# Patient Record
Sex: FEMALE | Race: WHITE | NOT HISPANIC OR LATINO | URBAN - METROPOLITAN AREA
[De-identification: names, ages, dates, MRNs, and addresses within clinical notes are randomized per-mention and may not be internally consistent; named-entity substitution may affect disease eponyms.]

---

## 2023-03-13 LAB
AMPHETAMINE (PRESENCE) IN URINE BY SCREEN METHOD: NORMAL
BARBITURATES PRESENCE IN URINE BY SCREEN METHOD: NORMAL
BENZODIAZEPINE (PRESENCE) IN URINE BY SCREEN METHOD: NORMAL
CANNABINOIDS IN URINE BY SCREEN METHOD: NORMAL
COCAINE (PRESENCE) IN URINE BY SCREEN METHOD: NORMAL
DRUG SCREEN COMMENT URINE: NORMAL
FENTANYL URINE: NORMAL
METHADONE (PRESENCE) IN URINE BY SCREEN METHOD: NORMAL
OPIATES (PRESENCE) IN URINE BY SCREEN METHOD: NORMAL
OXYCODONE (PRESENCE) IN URINE BY SCREEN METHOD: NORMAL
PHENCYCLIDINE (PRESENCE) IN URINE BY SCREEN METHOD: NORMAL

## 2023-11-20 PROBLEM — M65.4 DE QUERVAIN'S TENOSYNOVITIS, RIGHT: Status: ACTIVE | Noted: 2023-11-20

## 2023-11-20 PROBLEM — M25.561 KNEE PAIN, BILATERAL: Status: ACTIVE | Noted: 2023-11-20

## 2023-11-20 PROBLEM — S93.401A RIGHT ANKLE SPRAIN: Status: ACTIVE | Noted: 2023-11-20

## 2023-11-20 PROBLEM — F90.2 ADHD (ATTENTION DEFICIT HYPERACTIVITY DISORDER), COMBINED TYPE: Status: ACTIVE | Noted: 2023-11-20

## 2023-11-20 PROBLEM — G93.40 ENCEPHALOPATHY: Status: ACTIVE | Noted: 2023-11-20

## 2023-11-20 PROBLEM — M25.562 KNEE PAIN, BILATERAL: Status: ACTIVE | Noted: 2023-11-20

## 2023-11-20 RX ORDER — DEXTROAMPHETAMINE SACCHARATE, AMPHETAMINE ASPARTATE MONOHYDRATE, DEXTROAMPHETAMINE SULFATE AND AMPHETAMINE SULFATE 7.5; 7.5; 7.5; 7.5 MG/1; MG/1; MG/1; MG/1
CAPSULE, EXTENDED RELEASE ORAL
COMMUNITY
Start: 2021-02-25 | End: 2023-11-27

## 2023-11-27 ENCOUNTER — OFFICE VISIT (OUTPATIENT)
Dept: NEUROLOGY | Facility: CLINIC | Age: 33
End: 2023-11-27
Payer: COMMERCIAL

## 2023-11-27 VITALS
HEART RATE: 69 BPM | BODY MASS INDEX: 22.15 KG/M2 | HEIGHT: 63 IN | DIASTOLIC BLOOD PRESSURE: 84 MMHG | WEIGHT: 125 LBS | SYSTOLIC BLOOD PRESSURE: 114 MMHG

## 2023-11-27 DIAGNOSIS — G93.40 ENCEPHALOPATHY: Primary | ICD-10-CM

## 2023-11-27 DIAGNOSIS — F98.8 ADD (ATTENTION DEFICIT DISORDER) WITHOUT HYPERACTIVITY: ICD-10-CM

## 2023-11-27 PROCEDURE — 1036F TOBACCO NON-USER: CPT | Performed by: PSYCHIATRY & NEUROLOGY

## 2023-11-27 PROCEDURE — 99214 OFFICE O/P EST MOD 30 MIN: CPT | Performed by: PSYCHIATRY & NEUROLOGY

## 2023-11-27 RX ORDER — SULFAMETHOXAZOLE AND TRIMETHOPRIM 800; 160 MG/1; MG/1
1 TABLET ORAL 2 TIMES DAILY
COMMUNITY
Start: 2023-02-28 | End: 2023-03-07

## 2023-11-27 RX ORDER — AMPHETAMINE 20 MG/1
20 TABLET, EXTENDED RELEASE ORAL DAILY
Qty: 30 TABLET | Refills: 0 | Status: SHIPPED | OUTPATIENT
Start: 2024-01-25 | End: 2024-02-20 | Stop reason: SDUPTHER

## 2023-11-27 RX ORDER — CLOTRIMAZOLE 1 %
80 CREAM WITH APPLICATOR VAGINAL EVERY 6 HOURS PRN
COMMUNITY
Start: 2023-02-27

## 2023-11-27 RX ORDER — AMPHETAMINE 20 MG/1
TABLET, EXTENDED RELEASE ORAL
COMMUNITY
Start: 2023-11-09 | End: 2023-11-27 | Stop reason: SDUPTHER

## 2023-11-27 RX ORDER — FLUCONAZOLE 150 MG/1
TABLET ORAL
COMMUNITY
Start: 2023-01-04

## 2023-11-27 RX ORDER — AMPHETAMINE 20 MG/1
20 TABLET, EXTENDED RELEASE ORAL DAILY
Qty: 30 TABLET | Refills: 0 | Status: SHIPPED | OUTPATIENT
Start: 2023-12-26 | End: 2024-02-20 | Stop reason: SDUPTHER

## 2023-11-27 RX ORDER — AMPHETAMINE 20 MG/1
1 TABLET, EXTENDED RELEASE ORAL DAILY
Qty: 30 TABLET | Refills: 0 | Status: SHIPPED | OUTPATIENT
Start: 2023-11-27 | End: 2024-02-20 | Stop reason: SDUPTHER

## 2023-11-27 RX ORDER — CEPHALEXIN 500 MG/1
500 CAPSULE ORAL 2 TIMES DAILY
COMMUNITY
Start: 2022-11-30 | End: 2022-12-07

## 2023-11-27 RX ORDER — DEXMETHYLPHENIDATE HYDROCHLORIDE 30 MG/1
30 CAPSULE, EXTENDED RELEASE ORAL DAILY
COMMUNITY
Start: 2023-03-08 | End: 2023-11-27 | Stop reason: WASHOUT

## 2023-11-27 ASSESSMENT — ENCOUNTER SYMPTOMS
FREQUENCY: 0
PHOTOPHOBIA: 0
VOMITING: 0
DIFFICULTY URINATING: 0
HEADACHES: 0
LIGHT-HEADEDNESS: 0
NAUSEA: 0
NUMBNESS: 0
COUGH: 0
NECK PAIN: 0
FATIGUE: 0
SEIZURES: 0
CONFUSION: 0
WEAKNESS: 0
NECK STIFFNESS: 0
FEVER: 0
UNEXPECTED WEIGHT CHANGE: 0
BACK PAIN: 0
FACIAL ASYMMETRY: 0
WHEEZING: 0
BRUISES/BLEEDS EASILY: 0
HALLUCINATIONS: 0
ADENOPATHY: 0
AGITATION: 0
TREMORS: 0
ARTHRALGIAS: 0
TROUBLE SWALLOWING: 0
SPEECH DIFFICULTY: 0
SHORTNESS OF BREATH: 0
HYPERACTIVE: 0
PALPITATIONS: 0
SINUS PRESSURE: 0
ABDOMINAL PAIN: 0
SLEEP DISTURBANCE: 0
DIZZINESS: 0
EYE PAIN: 0
JOINT SWELLING: 0

## 2023-11-27 ASSESSMENT — PATIENT HEALTH QUESTIONNAIRE - PHQ9
1. LITTLE INTEREST OR PLEASURE IN DOING THINGS: NOT AT ALL
SUM OF ALL RESPONSES TO PHQ9 QUESTIONS 1 & 2: 0
2. FEELING DOWN, DEPRESSED OR HOPELESS: NOT AT ALL

## 2023-11-27 NOTE — PROGRESS NOTES
Melina Hurst  33 y.o.       SUBJECTIVE    ADHD  Pertinent negatives include no abdominal pain, arthralgias, chest pain, coughing, fatigue, fever, headaches, joint swelling, nausea, neck pain, numbness, rash, vomiting or weakness.      Melina is a 33-year-old young lady who was seen today for follow-up of her encephalopathy due to attention deficit disorder with difficulty at work and home.  Since last seen she has done very well on Dyanavel XR 20 mg daily and can tell a big difference when she is on medicine compared to of medicine.  No side effects of the medication today her physical and neurological examination was normal.  I would like to continue all her medications the way she is taking and have discussed the controlled substance policy, abusive potential, risk benefit and the precautions to be taken and depending on how she does I might make future recommendation when she comes back to see me in 3 months.    I did review the medication list.  Due to technical limitations of voice recognition and human error, this note may not accurately reflect the care of the patient.    Review of Systems   Constitutional:  Negative for fatigue, fever and unexpected weight change.   HENT:  Negative for dental problem, ear pain, hearing loss, sinus pressure, tinnitus and trouble swallowing.    Eyes:  Negative for photophobia, pain and visual disturbance.   Respiratory:  Negative for cough, shortness of breath and wheezing.    Cardiovascular:  Negative for chest pain, palpitations and leg swelling.   Gastrointestinal:  Negative for abdominal pain, nausea and vomiting.   Genitourinary:  Negative for difficulty urinating, enuresis and frequency.   Musculoskeletal:  Negative for arthralgias, back pain, joint swelling, neck pain and neck stiffness.   Skin:  Negative for pallor and rash.   Allergic/Immunologic: Negative for food allergies.   Neurological:  Negative for dizziness, tremors, seizures, syncope, facial  "asymmetry, speech difficulty, weakness, light-headedness, numbness and headaches.   Hematological:  Negative for adenopathy. Does not bruise/bleed easily.   Psychiatric/Behavioral:  Negative for agitation, behavioral problems, confusion, hallucinations and sleep disturbance. The patient is not hyperactive.         Patient Active Problem List   Diagnosis    ADHD (attention deficit hyperactivity disorder), combined type    De Quervain's tenosynovitis, right    Encephalopathy    Knee pain, bilateral    Right ankle sprain     Past Medical History:   Diagnosis Date    Personal history of other diseases of the female genital tract     History of dysmenorrhea    Personal history of other mental and behavioral disorders     History of attention deficit disorder     History reviewed. No pertinent surgical history.    reports that she has never smoked. She has never used smokeless tobacco.    /84 (BP Location: Left arm, Patient Position: Sitting)   Pulse 69   Ht 1.6 m (5' 3\")   Wt 56.7 kg (125 lb)   BMI 22.14 kg/m²     OBJECTIVE  Physical Exam/Neurological Exam   Constitutional: General appearance: no acute distress   Auscultation of Heart: Regular rate and rhythm, no murmurs, normal S1 and S2.   Carotid Arteries: Intact without any bruits.   Neck is supple.   No lymph adenopathy.   Peripheral Vascular Exam: Pulses +2 and equal in all extremities. No swelling, varicosities, edema or tenderness to palpations.    Abdomen is soft, nondistended. No organomegaly.  Mental status: The patient was in no distress, alert, interactive and cooperative. Affect is appropriate.   Orientation: oriented to person, oriented to place and oriented to time.   Memory: recent memory intact and remote memory intact.   Attention: normal attention span and normal concentrating ability.   Language: normal comprehension and no difficulty naming common objects.   Fund of knowledge: Patient displays adequate knowledge of current events, adequate " fund of knowledge regarding past history and adequate fund of knowledge regarding vocabulary.   Eyes: The ophthalmoscopic examination was normal. The fundi are visualized with normal disc margins and without.  Cranial nerve II: Visual fields full to confrontation.   Cranial nerves III, IV, and VI: Pupils round, equally reactive to light; no ptosis. EOMs intact. No nystagmus.   Cranial Nerve V: Facial sensation intact bilaterally.   Cranial nerve VII: Normal and symmetric facial strength.   Cranial nerve VIII: Hearing is intact bilaterally to finger rub / whisper.   Cranial nerves IX and X: Palate elevates symmetrically.   Cranial nerve XI: Shoulder shrug and neck rotation strength are intact.   Cranial nerve XII: Tongue midline with normal strength.   Motor: Motor exam was normal. Muscle bulk was normal in both upper and lower extremities. Muscle tone was normal in both upper and lower extremities. Muscle strength was 5/5 throughout. no abnormal or adventitious movements were present.   Deep Tendon Reflexes: left biceps 2+ , right biceps 2+, left triceps 2+, right triceps 2+, left brachioradialis 2+, right brachioradialis 2+, left patella 2+, right patella 2+, left ankle jerk 2+, right ankle jerk 2+   Plantar Reflex: Toes downgoing to plantar stimulation on the left. Toes downgoing to plantar stimulation on the right.   Sensory Exam: Normal to light touch.   Coordination: There is no limb dystaxia and rapid alternating movements are intact.  Gait: Gait is normal without spasticity, ataxia or bradykinesia. Stance is stable with a negative Romberg.      ASSESSMENT/PLAN  Diagnoses and all orders for this visit:  Encephalopathy  ADD (attention deficit disorder) without hyperactivity  -     Dyanavel XR 20 mg tablet, IR - ER, biphasic 24hr; Take 1 tablet by mouth once daily.  -     amphetamine (Dyanavel XR) 20 mg tablet, IR - ER, biphasic 24hr; Take 20 mg by mouth once daily. Do not start before December 26, 2023.  -      amphetamine (Dyanavel XR) 20 mg tablet, IR - ER, biphasic 24hr; Take 20 mg by mouth once daily. Do not start before January 25, 2024.        Ashu White MD  11/27/2023  3:36 PM

## 2024-02-20 ENCOUNTER — OFFICE VISIT (OUTPATIENT)
Dept: NEUROLOGY | Facility: CLINIC | Age: 34
End: 2024-02-20
Payer: COMMERCIAL

## 2024-02-20 VITALS
HEART RATE: 87 BPM | SYSTOLIC BLOOD PRESSURE: 110 MMHG | WEIGHT: 128.2 LBS | HEIGHT: 63 IN | BODY MASS INDEX: 22.71 KG/M2 | DIASTOLIC BLOOD PRESSURE: 78 MMHG

## 2024-02-20 DIAGNOSIS — G93.40 ENCEPHALOPATHY: Primary | ICD-10-CM

## 2024-02-20 DIAGNOSIS — F98.8 ADD (ATTENTION DEFICIT DISORDER) WITHOUT HYPERACTIVITY: ICD-10-CM

## 2024-02-20 DIAGNOSIS — F90.2 ADHD (ATTENTION DEFICIT HYPERACTIVITY DISORDER), COMBINED TYPE: ICD-10-CM

## 2024-02-20 LAB
AMPHETAMINES UR QL SCN: ABNORMAL
BARBITURATES UR QL SCN: ABNORMAL
BENZODIAZ UR QL SCN: ABNORMAL
BZE UR QL SCN: ABNORMAL
CANNABINOIDS UR QL SCN: ABNORMAL
FENTANYL+NORFENTANYL UR QL SCN: ABNORMAL
OPIATES UR QL SCN: ABNORMAL
OXYCODONE+OXYMORPHONE UR QL SCN: ABNORMAL
PCP UR QL SCN: ABNORMAL

## 2024-02-20 PROCEDURE — 80307 DRUG TEST PRSMV CHEM ANLYZR: CPT

## 2024-02-20 PROCEDURE — 99214 OFFICE O/P EST MOD 30 MIN: CPT | Performed by: PSYCHIATRY & NEUROLOGY

## 2024-02-20 PROCEDURE — 80324 DRUG SCREEN AMPHETAMINES 1/2: CPT

## 2024-02-20 PROCEDURE — 1036F TOBACCO NON-USER: CPT | Performed by: PSYCHIATRY & NEUROLOGY

## 2024-02-20 RX ORDER — AMPHETAMINE 20 MG/1
20 TABLET, EXTENDED RELEASE ORAL DAILY
Qty: 30 TABLET | Refills: 0 | Status: SHIPPED | OUTPATIENT
Start: 2024-03-20 | End: 2024-05-16 | Stop reason: SDUPTHER

## 2024-02-20 RX ORDER — AMPHETAMINE 20 MG/1
1 TABLET, EXTENDED RELEASE ORAL DAILY
Qty: 30 TABLET | Refills: 0 | Status: SHIPPED | OUTPATIENT
Start: 2024-02-20 | End: 2024-05-16 | Stop reason: SDUPTHER

## 2024-02-20 RX ORDER — AMPHETAMINE 20 MG/1
20 TABLET, EXTENDED RELEASE ORAL DAILY
Qty: 30 TABLET | Refills: 0 | Status: SHIPPED | OUTPATIENT
Start: 2024-04-19 | End: 2024-05-16 | Stop reason: SDUPTHER

## 2024-02-20 ASSESSMENT — ENCOUNTER SYMPTOMS
LIGHT-HEADEDNESS: 0
BRUISES/BLEEDS EASILY: 0
ARTHRALGIAS: 0
CONFUSION: 0
HYPERACTIVE: 0
SPEECH DIFFICULTY: 0
NAUSEA: 0
TREMORS: 0
NECK STIFFNESS: 0
TROUBLE SWALLOWING: 0
SINUS PRESSURE: 0
ABDOMINAL PAIN: 0
JOINT SWELLING: 0
SEIZURES: 0
FREQUENCY: 0
DIZZINESS: 0
FATIGUE: 0
VOMITING: 0
PALPITATIONS: 0
HEADACHES: 0
ADENOPATHY: 0
FACIAL ASYMMETRY: 0
PHOTOPHOBIA: 0
NECK PAIN: 0
WHEEZING: 0
SLEEP DISTURBANCE: 0
BACK PAIN: 0
COUGH: 0
UNEXPECTED WEIGHT CHANGE: 0
EYE PAIN: 0
NUMBNESS: 0
AGITATION: 0
FEVER: 0
DIFFICULTY URINATING: 0
WEAKNESS: 0
SHORTNESS OF BREATH: 0
HALLUCINATIONS: 0

## 2024-02-20 ASSESSMENT — PATIENT HEALTH QUESTIONNAIRE - PHQ9
SUM OF ALL RESPONSES TO PHQ9 QUESTIONS 1 & 2: 0
2. FEELING DOWN, DEPRESSED OR HOPELESS: NOT AT ALL
1. LITTLE INTEREST OR PLEASURE IN DOING THINGS: NOT AT ALL

## 2024-02-20 NOTE — PROGRESS NOTES
Melina Hurst  33 y.o.       SUBJECTIVE    ADHD  Pertinent negatives include no abdominal pain, arthralgias, chest pain, coughing, fatigue, fever, headaches, joint swelling, nausea, neck pain, numbness, rash, vomiting or weakness.      Radha is a 33-year-old young lady who was seen today for follow-up of her encephalopathy due to attention deficit disorder with difficulty at work at home.  Since last seen she has done very well on Dyanavel 20 mg daily her physical and neurological examination was normal.  Would like to continue her medicine the way she is taking and have discussed the controlled substance policy, abuse potential, risk benefit and the precautions to be taken and depending on how she does I might make future recommendation when she comes back to see me in 3 months.    I did review the medication list.      Due to technical limitations of voice recognition and human error, this note may not accurately reflect the care of the patient.    Review of Systems   Constitutional:  Negative for fatigue, fever and unexpected weight change.   HENT:  Negative for dental problem, ear pain, hearing loss, sinus pressure, tinnitus and trouble swallowing.    Eyes:  Negative for photophobia, pain and visual disturbance.   Respiratory:  Negative for cough, shortness of breath and wheezing.    Cardiovascular:  Negative for chest pain, palpitations and leg swelling.   Gastrointestinal:  Negative for abdominal pain, nausea and vomiting.   Genitourinary:  Negative for difficulty urinating, enuresis and frequency.   Musculoskeletal:  Negative for arthralgias, back pain, joint swelling, neck pain and neck stiffness.   Skin:  Negative for pallor and rash.   Allergic/Immunologic: Negative for food allergies.   Neurological:  Negative for dizziness, tremors, seizures, syncope, facial asymmetry, speech difficulty, weakness, light-headedness, numbness and headaches.   Hematological:  Negative for adenopathy. Does not  "bruise/bleed easily.   Psychiatric/Behavioral:  Negative for agitation, behavioral problems, confusion, hallucinations and sleep disturbance. The patient is not hyperactive.         Patient Active Problem List   Diagnosis    ADHD (attention deficit hyperactivity disorder), combined type    De Quervain's tenosynovitis, right    Encephalopathy    Knee pain, bilateral    Right ankle sprain     Past Medical History:   Diagnosis Date    Personal history of other diseases of the female genital tract     History of dysmenorrhea    Personal history of other mental and behavioral disorders     History of attention deficit disorder     History reviewed. No pertinent surgical history.    reports that she has never smoked. She has never used smokeless tobacco.    /78 (BP Location: Left arm, Patient Position: Sitting)   Pulse 87   Ht 1.6 m (5' 3\")   Wt 58.2 kg (128 lb 3.2 oz)   BMI 22.71 kg/m²     OBJECTIVE  Physical Exam/Neurological Exam   Constitutional: General appearance: no acute distress   Auscultation of Heart: Regular rate and rhythm, no murmurs, normal S1 and S2.   Carotid Arteries: Intact without any bruits.   Neck is supple.   No lymph adenopathy.   Peripheral Vascular Exam: Pulses +2 and equal in all extremities. No swelling, varicosities, edema or tenderness to palpations.    Abdomen is soft, nondistended. No organomegaly.  Mental status: The patient was in no distress, alert, interactive and cooperative. Affect is appropriate.   Orientation: oriented to person, oriented to place and oriented to time.   Memory: recent memory intact and remote memory intact.   Attention: normal attention span and normal concentrating ability.   Language: normal comprehension and no difficulty naming common objects.   Fund of knowledge: Patient displays adequate knowledge of current events, adequate fund of knowledge regarding past history and adequate fund of knowledge regarding vocabulary.   Eyes: The ophthalmoscopic " examination was normal. The fundi are visualized with normal disc margins and without.  Cranial nerve II: Visual fields full to confrontation.   Cranial nerves III, IV, and VI: Pupils round, equally reactive to light; no ptosis. EOMs intact. No nystagmus.   Cranial Nerve V: Facial sensation intact bilaterally.   Cranial nerve VII: Normal and symmetric facial strength.   Cranial nerve VIII: Hearing is intact bilaterally to finger rub / whisper.   Cranial nerves IX and X: Palate elevates symmetrically.   Cranial nerve XI: Shoulder shrug and neck rotation strength are intact.   Cranial nerve XII: Tongue midline with normal strength.   Motor: Motor exam was normal. Muscle bulk was normal in both upper and lower extremities. Muscle tone was normal in both upper and lower extremities. Muscle strength was 5/5 throughout. no abnormal or adventitious movements were present.   Deep Tendon Reflexes: left biceps 2+ , right biceps 2+, left triceps 2+, right triceps 2+, left brachioradialis 2+, right brachioradialis 2+, left patella 2+, right patella 2+, left ankle jerk 2+, right ankle jerk 2+   Plantar Reflex: Toes downgoing to plantar stimulation on the left. Toes downgoing to plantar stimulation on the right.   Sensory Exam: Normal to light touch.   Coordination: There is no limb dystaxia and rapid alternating movements are intact.  Gait: Gait is normal without spasticity, ataxia or bradykinesia. Stance is stable with a negative Romberg.      ASSESSMENT/PLAN  Diagnoses and all orders for this visit:  Encephalopathy  ADHD (attention deficit hyperactivity disorder), combined type  -     Drug Screen, Urine With Reflex to Confirmation; Future  ADD (attention deficit disorder) without hyperactivity  -     Dyanavel XR 20 mg tablet, IR - ER, biphasic 24hr; Take 1 tablet by mouth once daily.  -     Dyanavel XR 20 mg tablet, IR - ER, biphasic 24hr; Take 20 mg by mouth once daily. Do not start before March 20, 2024.  -     Dyanavel XR  20 mg tablet, IR - ER, biphasic 24hr; Take 20 mg by mouth once daily. Do not start before April 19, 2024.        Ashu White MD  2/20/2024  12:16 PM

## 2024-02-24 LAB
AMPHET UR-MCNC: 2732 NG/ML
MDA UR-MCNC: <200 NG/ML
MDEA UR-MCNC: <200 NG/ML
MDMA UR-MCNC: <200 NG/ML
METHAMPHET UR-MCNC: <200 NG/ML
PHENTERMINE UR CFM-MCNC: <200 NG/ML

## 2024-05-16 ENCOUNTER — OFFICE VISIT (OUTPATIENT)
Dept: NEUROLOGY | Facility: CLINIC | Age: 34
End: 2024-05-16
Payer: COMMERCIAL

## 2024-05-16 VITALS
SYSTOLIC BLOOD PRESSURE: 121 MMHG | HEIGHT: 63 IN | BODY MASS INDEX: 22.86 KG/M2 | WEIGHT: 129 LBS | HEART RATE: 87 BPM | DIASTOLIC BLOOD PRESSURE: 80 MMHG

## 2024-05-16 DIAGNOSIS — G93.40 ENCEPHALOPATHY: Primary | ICD-10-CM

## 2024-05-16 DIAGNOSIS — F98.8 ADD (ATTENTION DEFICIT DISORDER) WITHOUT HYPERACTIVITY: ICD-10-CM

## 2024-05-16 PROCEDURE — 99214 OFFICE O/P EST MOD 30 MIN: CPT | Performed by: PSYCHIATRY & NEUROLOGY

## 2024-05-16 PROCEDURE — 1036F TOBACCO NON-USER: CPT | Performed by: PSYCHIATRY & NEUROLOGY

## 2024-05-16 RX ORDER — AMPHETAMINE 20 MG/1
1 TABLET, EXTENDED RELEASE ORAL DAILY
Qty: 30 TABLET | Refills: 0 | Status: SHIPPED | OUTPATIENT
Start: 2024-07-15 | End: 2024-08-14

## 2024-05-16 RX ORDER — AMPHETAMINE 20 MG/1
20 TABLET, EXTENDED RELEASE ORAL DAILY
Qty: 30 TABLET | Refills: 0 | Status: SHIPPED | OUTPATIENT
Start: 2024-06-15 | End: 2024-07-15

## 2024-05-16 RX ORDER — AMPHETAMINE 20 MG/1
20 TABLET, EXTENDED RELEASE ORAL DAILY
Qty: 30 TABLET | Refills: 0 | Status: SHIPPED | OUTPATIENT
Start: 2024-05-16 | End: 2024-06-15

## 2024-05-16 ASSESSMENT — ENCOUNTER SYMPTOMS
EYE PAIN: 0
SINUS PRESSURE: 0
PALPITATIONS: 0
PHOTOPHOBIA: 0
SLEEP DISTURBANCE: 0
ADENOPATHY: 0
FREQUENCY: 0
TREMORS: 0
BACK PAIN: 0
TROUBLE SWALLOWING: 0
NECK PAIN: 0
NERVOUS/ANXIOUS: 1
DIFFICULTY URINATING: 0
COUGH: 0
FEVER: 0
HEADACHES: 0
HYPERACTIVE: 0
VOMITING: 0
HALLUCINATIONS: 0
WEAKNESS: 0
CONFUSION: 0
SHORTNESS OF BREATH: 0
FACIAL ASYMMETRY: 0
FATIGUE: 0
DECREASED CONCENTRATION: 1
ARTHRALGIAS: 0
WHEEZING: 0
BRUISES/BLEEDS EASILY: 0
SPEECH DIFFICULTY: 0
LIGHT-HEADEDNESS: 0
NUMBNESS: 0
UNEXPECTED WEIGHT CHANGE: 0
AGITATION: 0
ABDOMINAL PAIN: 0
NAUSEA: 0
NECK STIFFNESS: 0
JOINT SWELLING: 0
DIZZINESS: 0
SEIZURES: 0

## 2024-05-16 ASSESSMENT — PATIENT HEALTH QUESTIONNAIRE - PHQ9
2. FEELING DOWN, DEPRESSED OR HOPELESS: NOT AT ALL
1. LITTLE INTEREST OR PLEASURE IN DOING THINGS: NOT AT ALL
1. LITTLE INTEREST OR PLEASURE IN DOING THINGS: NOT AT ALL
SUM OF ALL RESPONSES TO PHQ9 QUESTIONS 1 AND 2: 0
2. FEELING DOWN, DEPRESSED OR HOPELESS: NOT AT ALL
SUM OF ALL RESPONSES TO PHQ9 QUESTIONS 1 & 2: 0

## 2024-05-16 NOTE — PROGRESS NOTES
Melina Hurst  33 y.o.       SUBJECTIVE    HPI   Melina is a 33-year-old young lady who was seen today for follow-up of her encephalopathy due to attention deficit disorder with difficulty at work at home.  Since last seen she she has done very well on Dyanavel 20 mg daily and can tell a big difference when she is on medicine compared off medicine.  No side effects of the medication.  Today her physical and neurological examination was normal.  I would like to continue her medicine the way she is taking and have discussed the controlled substance policy, abusive potential, risk benefit and the precautions to be taken and depending on how she does I might make future recommendation when she comes back to see me in 3 months.    I did review the medication list.      Due to technical limitations of voice recognition and human error, this note may not accurately reflect the care of the patient.    Review of Systems   Constitutional:  Negative for fatigue, fever and unexpected weight change.   HENT:  Negative for dental problem, ear pain, hearing loss, sinus pressure, tinnitus and trouble swallowing.    Eyes:  Negative for photophobia, pain and visual disturbance.   Respiratory:  Negative for cough, shortness of breath and wheezing.    Cardiovascular:  Negative for chest pain, palpitations and leg swelling.   Gastrointestinal:  Negative for abdominal pain, nausea and vomiting.   Genitourinary:  Negative for difficulty urinating, enuresis and frequency.   Musculoskeletal:  Negative for arthralgias, back pain, joint swelling, neck pain and neck stiffness.   Skin:  Negative for pallor and rash.   Allergic/Immunologic: Negative for food allergies.   Neurological:  Negative for dizziness, tremors, seizures, syncope, facial asymmetry, speech difficulty, weakness, light-headedness, numbness and headaches.   Hematological:  Negative for adenopathy. Does not bruise/bleed easily.   Psychiatric/Behavioral:  Positive for  "decreased concentration. Negative for agitation, behavioral problems, confusion, hallucinations and sleep disturbance. The patient is nervous/anxious. The patient is not hyperactive.         Patient Active Problem List   Diagnosis    ADHD (attention deficit hyperactivity disorder), combined type    De Quervain's tenosynovitis, right    Encephalopathy    Knee pain, bilateral    Right ankle sprain     Past Medical History:   Diagnosis Date    Personal history of other diseases of the female genital tract     History of dysmenorrhea    Personal history of other mental and behavioral disorders     History of attention deficit disorder     No past surgical history on file.    reports that she has never smoked. She has never used smokeless tobacco. She reports that she does not use drugs.    /80   Pulse 87   Ht 1.6 m (5' 3\")   Wt 58.5 kg (129 lb)   BMI 22.85 kg/m²     OBJECTIVE  Physical Exam/Neurological Exam   Constitutional: General appearance: no acute distress   Auscultation of Heart: Regular rate and rhythm, no murmurs, normal S1 and S2.   Carotid Arteries: Intact without any bruits.   Neck is supple.   No lymph adenopathy.   Peripheral Vascular Exam: Pulses +2 and equal in all extremities. No swelling, varicosities, edema or tenderness to palpations.    Abdomen is soft, nondistended. No organomegaly.  Mental status: The patient was in no distress, alert, interactive and cooperative. Affect is appropriate.   Orientation: oriented to person, oriented to place and oriented to time.   Memory: recent memory intact and remote memory intact.   Attention: normal attention span and normal concentrating ability.   Language: normal comprehension and no difficulty naming common objects.   Fund of knowledge: Patient displays adequate knowledge of current events, adequate fund of knowledge regarding past history and adequate fund of knowledge regarding vocabulary.   Eyes: The ophthalmoscopic examination was normal. The " fundi are visualized with normal disc margins and without.  Cranial nerve II: Visual fields full to confrontation.   Cranial nerves III, IV, and VI: Pupils round, equally reactive to light; no ptosis. EOMs intact. No nystagmus.   Cranial Nerve V: Facial sensation intact bilaterally.   Cranial nerve VII: Normal and symmetric facial strength.   Cranial nerve VIII: Hearing is intact bilaterally to finger rub / whisper.   Cranial nerves IX and X: Palate elevates symmetrically.   Cranial nerve XI: Shoulder shrug and neck rotation strength are intact.   Cranial nerve XII: Tongue midline with normal strength.   Motor: Motor exam was normal. Muscle bulk was normal in both upper and lower extremities. Muscle tone was normal in both upper and lower extremities. Muscle strength was 5/5 throughout. no abnormal or adventitious movements were present.   Deep Tendon Reflexes: left biceps 2+ , right biceps 2+, left triceps 2+, right triceps 2+, left brachioradialis 2+, right brachioradialis 2+, left patella 2+, right patella 2+, left ankle jerk 2+, right ankle jerk 2+   Plantar Reflex: Toes downgoing to plantar stimulation on the left. Toes downgoing to plantar stimulation on the right.   Sensory Exam: Normal to light touch.   Coordination: There is no limb dystaxia and rapid alternating movements are intact.  Gait: Gait is normal without spasticity, ataxia or bradykinesia. Stance is stable with a negative Romberg.      ASSESSMENT/PLAN  Diagnoses and all orders for this visit:  Encephalopathy  ADD (attention deficit disorder) without hyperactivity  -     Dyanavel XR 20 mg tablet, IR - ER, biphasic 24hr; Take 20 mg by mouth once daily.  -     Dyanavel XR 20 mg tablet, IR - ER, biphasic 24hr; Take 20 mg by mouth once daily. Do not fill before Camilla 15, 2024.  -     Dyanavel XR 20 mg tablet, IR - ER, biphasic 24hr; Take 1 tablet by mouth once daily. Do not fill before July 15, 2024.        Ashu White MD  5/16/2024  3:24 PM

## 2024-08-08 ENCOUNTER — APPOINTMENT (OUTPATIENT)
Dept: NEUROLOGY | Facility: CLINIC | Age: 34
End: 2024-08-08
Payer: COMMERCIAL

## 2024-08-08 VITALS
DIASTOLIC BLOOD PRESSURE: 86 MMHG | BODY MASS INDEX: 22.54 KG/M2 | HEART RATE: 105 BPM | SYSTOLIC BLOOD PRESSURE: 125 MMHG | HEIGHT: 63 IN | WEIGHT: 127.2 LBS

## 2024-08-08 DIAGNOSIS — G93.40 ENCEPHALOPATHY: Primary | ICD-10-CM

## 2024-08-08 DIAGNOSIS — F98.8 ADD (ATTENTION DEFICIT DISORDER) WITHOUT HYPERACTIVITY: ICD-10-CM

## 2024-08-08 PROBLEM — A64 STD (FEMALE): Status: ACTIVE | Noted: 2024-08-08

## 2024-08-08 PROBLEM — N20.0 KIDNEY STONE: Status: ACTIVE | Noted: 2024-08-08

## 2024-08-08 PROBLEM — O20.9 FIRST TRIMESTER BLEEDING (HHS-HCC): Status: ACTIVE | Noted: 2020-05-06

## 2024-08-08 PROBLEM — B27.90 MONONUCLEOSIS: Status: ACTIVE | Noted: 2024-08-08

## 2024-08-08 PROBLEM — Z86.32 HISTORY OF DIET CONTROLLED GESTATIONAL DIABETES MELLITUS (GDM): Status: ACTIVE | Noted: 2019-03-14

## 2024-08-08 PROCEDURE — 1036F TOBACCO NON-USER: CPT | Performed by: PSYCHIATRY & NEUROLOGY

## 2024-08-08 PROCEDURE — 99214 OFFICE O/P EST MOD 30 MIN: CPT | Performed by: PSYCHIATRY & NEUROLOGY

## 2024-08-08 PROCEDURE — 3008F BODY MASS INDEX DOCD: CPT | Performed by: PSYCHIATRY & NEUROLOGY

## 2024-08-08 RX ORDER — AMPHETAMINE 20 MG/1
20 TABLET, EXTENDED RELEASE ORAL DAILY
Qty: 30 TABLET | Refills: 0 | Status: SHIPPED | OUTPATIENT
Start: 2024-08-08 | End: 2024-09-07

## 2024-08-08 RX ORDER — OMEPRAZOLE 20 MG/1
TABLET, DELAYED RELEASE ORAL
COMMUNITY

## 2024-08-08 RX ORDER — AMPHETAMINE 20 MG/1
1 TABLET, EXTENDED RELEASE ORAL DAILY
Qty: 30 TABLET | Refills: 0 | Status: SHIPPED | OUTPATIENT
Start: 2024-09-07 | End: 2024-10-07

## 2024-08-08 RX ORDER — NORETHINDRONE ACETATE AND ETHINYL ESTRADIOL 1.5-30(21)
1 KIT ORAL DAILY
COMMUNITY

## 2024-08-08 RX ORDER — NORETHINDRONE ACETATE AND ETHINYL ESTRADIOL 1MG-20(21)
1 KIT ORAL DAILY
COMMUNITY

## 2024-08-08 RX ORDER — AMOXICILLIN 500 MG/1
CAPSULE ORAL
COMMUNITY
Start: 2024-01-16

## 2024-08-08 RX ORDER — AMPHETAMINE 20 MG/1
20 TABLET, EXTENDED RELEASE ORAL DAILY
Qty: 30 TABLET | Refills: 0 | Status: SHIPPED | OUTPATIENT
Start: 2024-10-06 | End: 2024-11-05

## 2024-08-08 RX ORDER — CEFDINIR 300 MG/1
CAPSULE ORAL
COMMUNITY
Start: 2024-01-18

## 2024-08-08 ASSESSMENT — ENCOUNTER SYMPTOMS
DIFFICULTY URINATING: 0
SLEEP DISTURBANCE: 1
WEAKNESS: 0
SHORTNESS OF BREATH: 0
JOINT SWELLING: 0
BRUISES/BLEEDS EASILY: 0
SPEECH DIFFICULTY: 0
SEIZURES: 0
TREMORS: 0
HYPERACTIVE: 0
COUGH: 0
ABDOMINAL PAIN: 0
NUMBNESS: 0
PHOTOPHOBIA: 0
UNEXPECTED WEIGHT CHANGE: 0
TROUBLE SWALLOWING: 0
PALPITATIONS: 0
ADENOPATHY: 0
VOMITING: 0
AGITATION: 0
LIGHT-HEADEDNESS: 0
FATIGUE: 0
EYE PAIN: 0
WHEEZING: 0
ARTHRALGIAS: 0
FACIAL ASYMMETRY: 0
CONFUSION: 0
SINUS PRESSURE: 0
HEADACHES: 0
HALLUCINATIONS: 0
NECK PAIN: 0
NERVOUS/ANXIOUS: 1
BACK PAIN: 0
DIZZINESS: 0
FREQUENCY: 0
NECK STIFFNESS: 0
NAUSEA: 0
DECREASED CONCENTRATION: 1
FEVER: 0

## 2024-08-08 NOTE — PROGRESS NOTES
Melina Hurst  34 y.o.       SUBJECTIVE    HPI   Melina is a 34-year-old young lady who was seen today for follow-up of her encephalopathy due to attention deficit disorder with difficulty at work and home.  Since last seen she has done very well on Dyanavel 20 mg daily and can tell a big difference when she is on medicine compared off medicine.  No side effects from the medication.  Today her physical and neurological examination was normal.  I would like to continue her Dyanavel the way she is taking and continue with supportive care and depending on how she does I might make future recommendation.  I have discussed the controlled substance policy, abuse potential, risk benefit and the precautions to be taken and depending on how she does I might make future recommendation when she comes back to see me in 3 months.    I did review the medication list.      Due to technical limitations of voice recognition and human error, this note may not accurately reflect the care of the patient.    Review of Systems   Constitutional:  Negative for fatigue, fever and unexpected weight change.   HENT:  Negative for dental problem, ear pain, hearing loss, sinus pressure, tinnitus and trouble swallowing.    Eyes:  Negative for photophobia, pain and visual disturbance.   Respiratory:  Negative for cough, shortness of breath and wheezing.    Cardiovascular:  Negative for chest pain, palpitations and leg swelling.   Gastrointestinal:  Negative for abdominal pain, nausea and vomiting.   Genitourinary:  Negative for difficulty urinating, enuresis and frequency.   Musculoskeletal:  Negative for arthralgias, back pain, joint swelling, neck pain and neck stiffness.   Skin:  Negative for pallor and rash.   Allergic/Immunologic: Negative for food allergies.   Neurological:  Negative for dizziness, tremors, seizures, syncope, facial asymmetry, speech difficulty, weakness, light-headedness, numbness and headaches.   Hematological:   "Negative for adenopathy. Does not bruise/bleed easily.   Psychiatric/Behavioral:  Positive for decreased concentration and sleep disturbance. Negative for agitation, behavioral problems, confusion and hallucinations. The patient is nervous/anxious. The patient is not hyperactive.         Patient Active Problem List   Diagnosis    ADHD (attention deficit hyperactivity disorder), combined type    De Quervain's tenosynovitis, right    Encephalopathy    Knee pain, bilateral    Right ankle sprain    First trimester bleeding ([facility]HS-HCC)    History of diet controlled gestational diabetes mellitus (GDM)    Kidney stone    Mononucleosis    STD (female)    ADHD (attention deficit hyperactivity disorder)     Past Medical History:   Diagnosis Date    Personal history of other diseases of the female genital tract     History of dysmenorrhea    Personal history of other mental and behavioral disorders     History of attention deficit disorder     No past surgical history on file.    reports that she has never smoked. She has never used smokeless tobacco. Alcohol use questions deferred to the physician. She reports that she does not use drugs.    /86 (BP Location: Left arm, Patient Position: Sitting, BP Cuff Size: Adult)   Pulse 105   Ht 1.6 m (5' 3\")   Wt 57.7 kg (127 lb 3.2 oz)   BMI 22.53 kg/m²     OBJECTIVE  Physical Exam/Neurological Exam   Constitutional: General appearance: no acute distress   Auscultation of Heart: Regular rate and rhythm, no murmurs, normal S1 and S2.   Carotid Arteries: Intact without any bruits.   Neck is supple.   No lymph adenopathy.   Peripheral Vascular Exam: Pulses +2 and equal in all extremities. No swelling, varicosities, edema or tenderness to palpations.    Abdomen is soft, nondistended. No organomegaly.  Mental status: The patient was in no distress, alert, interactive and cooperative. Affect is appropriate.   Orientation: oriented to person, oriented to place and oriented to time. "   Memory: recent memory intact and remote memory intact.   Attention: normal attention span and normal concentrating ability.   Language: normal comprehension and no difficulty naming common objects.   Fund of knowledge: Patient displays adequate knowledge of current events, adequate fund of knowledge regarding past history and adequate fund of knowledge regarding vocabulary.   Eyes: The ophthalmoscopic examination was normal. The fundi are visualized with normal disc margins and without.  Cranial nerve II: Visual fields full to confrontation.   Cranial nerves III, IV, and VI: Pupils round, equally reactive to light; no ptosis. EOMs intact. No nystagmus.   Cranial Nerve V: Facial sensation intact bilaterally.   Cranial nerve VII: Normal and symmetric facial strength.   Cranial nerve VIII: Hearing is intact bilaterally to finger rub / whisper.   Cranial nerves IX and X: Palate elevates symmetrically.   Cranial nerve XI: Shoulder shrug and neck rotation strength are intact.   Cranial nerve XII: Tongue midline with normal strength.   Motor: Motor exam was normal. Muscle bulk was normal in both upper and lower extremities. Muscle tone was normal in both upper and lower extremities. Muscle strength was 5/5 throughout. no abnormal or adventitious movements were present.   Deep Tendon Reflexes: left biceps 2+ , right biceps 2+, left triceps 2+, right triceps 2+, left brachioradialis 2+, right brachioradialis 2+, left patella 2+, right patella 2+, left ankle jerk 2+, right ankle jerk 2+   Plantar Reflex: Toes downgoing to plantar stimulation on the left. Toes downgoing to plantar stimulation on the right.   Sensory Exam: Normal to light touch.   Coordination: There is no limb dystaxia and rapid alternating movements are intact.  Gait: Gait is normal without spasticity, ataxia or bradykinesia. Stance is stable with a negative Romberg.      ASSESSMENT/PLAN  Diagnoses and all orders for this visit:  Encephalopathy  ADD  (attention deficit disorder) without hyperactivity  -     Dyanavel XR 20 mg tablet, IR - ER, biphasic 24hr; Take 20 mg by mouth once daily.  -     Dyanavel XR 20 mg tablet, IR - ER, biphasic 24hr; Take 1 tablet by mouth once daily. Do not fill before September 7, 2024.  -     Dyanavel XR 20 mg tablet, IR - ER, biphasic 24hr; Take 20 mg by mouth once daily. Do not fill before October 6, 2024.        Ashu White MD  8/8/2024  2:33 PM

## 2024-12-05 ENCOUNTER — APPOINTMENT (OUTPATIENT)
Dept: NEUROLOGY | Facility: CLINIC | Age: 34
End: 2024-12-05
Payer: COMMERCIAL

## 2024-12-05 VITALS
BODY MASS INDEX: 23.18 KG/M2 | DIASTOLIC BLOOD PRESSURE: 80 MMHG | HEIGHT: 63 IN | SYSTOLIC BLOOD PRESSURE: 114 MMHG | HEART RATE: 89 BPM | WEIGHT: 130.8 LBS

## 2024-12-05 DIAGNOSIS — F98.8 ADD (ATTENTION DEFICIT DISORDER) WITHOUT HYPERACTIVITY: ICD-10-CM

## 2024-12-05 DIAGNOSIS — G93.40 ENCEPHALOPATHY, UNSPECIFIED TYPE: Primary | ICD-10-CM

## 2024-12-05 PROCEDURE — 3008F BODY MASS INDEX DOCD: CPT | Performed by: PSYCHIATRY & NEUROLOGY

## 2024-12-05 PROCEDURE — 1036F TOBACCO NON-USER: CPT | Performed by: PSYCHIATRY & NEUROLOGY

## 2024-12-05 PROCEDURE — 99214 OFFICE O/P EST MOD 30 MIN: CPT | Performed by: PSYCHIATRY & NEUROLOGY

## 2024-12-05 RX ORDER — AMPHETAMINE 20 MG/1
1 TABLET, EXTENDED RELEASE ORAL DAILY
Qty: 30 TABLET | Refills: 0 | Status: SHIPPED | OUTPATIENT
Start: 2024-12-05 | End: 2025-01-04

## 2024-12-05 RX ORDER — AMPHETAMINE 20 MG/1
20 TABLET, EXTENDED RELEASE ORAL DAILY
Qty: 30 TABLET | Refills: 0 | Status: SHIPPED | OUTPATIENT
Start: 2025-02-03 | End: 2025-03-05

## 2024-12-05 RX ORDER — AMPHETAMINE 20 MG/1
20 TABLET, EXTENDED RELEASE ORAL DAILY
Qty: 30 TABLET | Refills: 0 | Status: SHIPPED | OUTPATIENT
Start: 2025-01-04 | End: 2025-02-03

## 2024-12-05 ASSESSMENT — ENCOUNTER SYMPTOMS
BACK PAIN: 0
FEVER: 0
ARTHRALGIAS: 0
WEAKNESS: 0
FACIAL ASYMMETRY: 0
UNEXPECTED WEIGHT CHANGE: 0
HYPERACTIVE: 0
PHOTOPHOBIA: 0
FATIGUE: 0
SEIZURES: 0
NAUSEA: 0
SPEECH DIFFICULTY: 0
HALLUCINATIONS: 0
NUMBNESS: 0
PALPITATIONS: 0
ABDOMINAL PAIN: 0
BRUISES/BLEEDS EASILY: 0
DECREASED CONCENTRATION: 1
AGITATION: 0
TROUBLE SWALLOWING: 0
EYE PAIN: 0
COUGH: 0
SINUS PRESSURE: 0
NECK STIFFNESS: 0
ADENOPATHY: 0
JOINT SWELLING: 0
WHEEZING: 0
CONFUSION: 0
FREQUENCY: 0
DIZZINESS: 0
SLEEP DISTURBANCE: 1
VOMITING: 0
NECK PAIN: 0
SHORTNESS OF BREATH: 0
DIFFICULTY URINATING: 0
HEADACHES: 0
TREMORS: 0
LIGHT-HEADEDNESS: 0

## 2024-12-05 ASSESSMENT — PATIENT HEALTH QUESTIONNAIRE - PHQ9
1. LITTLE INTEREST OR PLEASURE IN DOING THINGS: NOT AT ALL
2. FEELING DOWN, DEPRESSED OR HOPELESS: NOT AT ALL
SUM OF ALL RESPONSES TO PHQ9 QUESTIONS 1 & 2: 0

## 2024-12-05 NOTE — PROGRESS NOTES
Melina Hurst  34 y.o.       SUBJECTIVE  Melina is a 34-year-old young lady who was seen today for follow-up of her encephalopathy due to attention deficit disorder with difficulty at work at home.  Since last seen she has done very well on Dyanavel 20 mg daily and can tell a big difference when she is on medicine compared off medicine.  No side effects from the medication.  Today her physical and neurological examination was normal.  I would like to continue her Dyanavel the way she is taking and have discussed the controlled substance policy, abuse potential, risk benefit and the precautions to be taken and depending on how she does I might make future recommendation when she comes back to see me in 3 to 4 months    I did review the medication list.  Due to technical limitations of voice recognition and human error, this note may not accurately reflect the care of the patient.    Review of Systems   Constitutional:  Negative for fatigue, fever and unexpected weight change.   HENT:  Negative for dental problem, ear pain, hearing loss, sinus pressure, tinnitus and trouble swallowing.    Eyes:  Negative for photophobia, pain and visual disturbance.   Respiratory:  Negative for cough, shortness of breath and wheezing.    Cardiovascular:  Negative for chest pain, palpitations and leg swelling.   Gastrointestinal:  Negative for abdominal pain, nausea and vomiting.   Genitourinary:  Negative for difficulty urinating, enuresis and frequency.   Musculoskeletal:  Negative for arthralgias, back pain, joint swelling, neck pain and neck stiffness.   Skin:  Negative for pallor and rash.   Allergic/Immunologic: Negative for food allergies.   Neurological:  Negative for dizziness, tremors, seizures, syncope, facial asymmetry, speech difficulty, weakness, light-headedness, numbness and headaches.   Hematological:  Negative for adenopathy. Does not bruise/bleed easily.   Psychiatric/Behavioral:  Positive for decreased  "concentration and sleep disturbance. Negative for agitation, behavioral problems, confusion and hallucinations. The patient is not hyperactive.         Patient Active Problem List   Diagnosis    ADHD (attention deficit hyperactivity disorder), combined type    De Quervain's tenosynovitis, right    Encephalopathy    Knee pain, bilateral    Right ankle sprain    First trimester bleeding ([facility]HS-HCC)    History of diet controlled gestational diabetes mellitus (GDM)    Kidney stone    Mononucleosis    STD (female)    ADHD (attention deficit hyperactivity disorder)     Past Medical History:   Diagnosis Date    Personal history of other diseases of the female genital tract     History of dysmenorrhea    Personal history of other mental and behavioral disorders     History of attention deficit disorder     No past surgical history on file.    reports that she has never smoked. She has never used smokeless tobacco. She reports that she does not currently use alcohol. She reports that she does not use drugs.    /80 (BP Location: Left arm, Patient Position: Sitting, BP Cuff Size: Adult)   Pulse 89   Ht 1.6 m (5' 3\")   Wt 59.3 kg (130 lb 12.8 oz)   BMI 23.17 kg/m²     OBJECTIVE  Physical Exam/Neurological Exam   Constitutional: General appearance: no acute distress   Auscultation of Heart: Regular rate and rhythm, no murmurs, normal S1 and S2.   Carotid Arteries: Intact without any bruits.   Neck is supple.   No lymph adenopathy.   Peripheral Vascular Exam: Pulses +2 and equal in all extremities. No swelling, varicosities, edema or tenderness to palpations.    Abdomen is soft, nondistended. No organomegaly.  Mental status: The patient was in no distress, alert, interactive and cooperative. Affect is appropriate.   Orientation: oriented to person, oriented to place and oriented to time.   Memory: recent memory intact and remote memory intact.   Attention: normal attention span and normal concentrating ability. "   Language: normal comprehension and no difficulty naming common objects.   Fund of knowledge: Patient displays adequate knowledge of current events, adequate fund of knowledge regarding past history and adequate fund of knowledge regarding vocabulary.   Eyes: The ophthalmoscopic examination was normal. The fundi are visualized with normal disc margins and without.  Cranial nerve II: Visual fields full to confrontation.   Cranial nerves III, IV, and VI: Pupils round, equally reactive to light; no ptosis. EOMs intact. No nystagmus.   Cranial Nerve V: Facial sensation intact bilaterally.   Cranial nerve VII: Normal and symmetric facial strength.   Cranial nerve VIII: Hearing is intact bilaterally to finger rub / whisper.   Cranial nerves IX and X: Palate elevates symmetrically.   Cranial nerve XI: Shoulder shrug and neck rotation strength are intact.   Cranial nerve XII: Tongue midline with normal strength.   Motor: Motor exam was normal. Muscle bulk was normal in both upper and lower extremities. Muscle tone was normal in both upper and lower extremities. Muscle strength was 5/5 throughout. no abnormal or adventitious movements were present.   Deep Tendon Reflexes: left biceps 2+ , right biceps 2+, left triceps 2+, right triceps 2+, left brachioradialis 2+, right brachioradialis 2+, left patella 2+, right patella 2+, left ankle jerk 2+, right ankle jerk 2+   Plantar Reflex: Toes downgoing to plantar stimulation on the left. Toes downgoing to plantar stimulation on the right.   Sensory Exam: Normal to light touch.   Coordination: There is no limb dystaxia and rapid alternating movements are intact.  Gait: Gait is normal without spasticity, ataxia or bradykinesia. Stance is stable with a negative Romberg.      ASSESSMENT/PLAN  Diagnoses and all orders for this visit:  Encephalopathy, unspecified type  ADD (attention deficit disorder) without hyperactivity  -     Dyanavel XR 20 mg tablet, IR - ER, biphasic 24hr; Take 1  tablet by mouth once daily.  -     Dyanavel XR 20 mg tablet, IR - ER, biphasic 24hr; Take 20 mg by mouth once daily. Do not fill before January 4, 2025.  -     Dyanavel XR 20 mg tablet, IR - ER, biphasic 24hr; Take 20 mg by mouth once daily. Do not fill before February 3, 2025.        Ashu White MD  12/5/2024  12:28 PM

## 2025-03-27 ENCOUNTER — APPOINTMENT (OUTPATIENT)
Dept: NEUROLOGY | Facility: CLINIC | Age: 35
End: 2025-03-27
Payer: COMMERCIAL

## 2025-03-27 VITALS
WEIGHT: 126.2 LBS | HEART RATE: 93 BPM | BODY MASS INDEX: 22.36 KG/M2 | DIASTOLIC BLOOD PRESSURE: 70 MMHG | HEIGHT: 63 IN | SYSTOLIC BLOOD PRESSURE: 104 MMHG

## 2025-03-27 DIAGNOSIS — G93.40 ENCEPHALOPATHY, UNSPECIFIED TYPE: Primary | ICD-10-CM

## 2025-03-27 DIAGNOSIS — F90.2 ADHD (ATTENTION DEFICIT HYPERACTIVITY DISORDER), COMBINED TYPE: ICD-10-CM

## 2025-03-27 PROCEDURE — 1036F TOBACCO NON-USER: CPT | Performed by: PSYCHIATRY & NEUROLOGY

## 2025-03-27 PROCEDURE — 99214 OFFICE O/P EST MOD 30 MIN: CPT | Performed by: PSYCHIATRY & NEUROLOGY

## 2025-03-27 PROCEDURE — 3008F BODY MASS INDEX DOCD: CPT | Performed by: PSYCHIATRY & NEUROLOGY

## 2025-03-27 ASSESSMENT — ENCOUNTER SYMPTOMS
DIZZINESS: 0
FEVER: 0
SPEECH DIFFICULTY: 0
EYE PAIN: 0
SHORTNESS OF BREATH: 0
UNEXPECTED WEIGHT CHANGE: 0
TREMORS: 0
BRUISES/BLEEDS EASILY: 0
NECK PAIN: 0
HEADACHES: 0
FREQUENCY: 0
ARTHRALGIAS: 0
SLEEP DISTURBANCE: 0
FACIAL ASYMMETRY: 0
COUGH: 0
NERVOUS/ANXIOUS: 1
DECREASED CONCENTRATION: 1
NAUSEA: 0
CONFUSION: 0
SEIZURES: 0
JOINT SWELLING: 0
AGITATION: 0
DIFFICULTY URINATING: 0
WEAKNESS: 0
ABDOMINAL PAIN: 0
BACK PAIN: 0
NUMBNESS: 0
PALPITATIONS: 0
HYPERACTIVE: 0
NECK STIFFNESS: 0
SINUS PRESSURE: 0
PHOTOPHOBIA: 0
LIGHT-HEADEDNESS: 0
WHEEZING: 0
ADENOPATHY: 0
TROUBLE SWALLOWING: 0
VOMITING: 0
HALLUCINATIONS: 0
FATIGUE: 0

## 2025-03-27 NOTE — PROGRESS NOTES
Melina Hurst  34 y.o.       SUBJECTIVE  Melina is a 34-year-old young lady who was seen today for follow-up of her encephalopathy due to attention deficit disorder with difficulty at work at home.  Since last seen she has done very well on Dyanavel XR 20 mg daily and can tell a big difference when she is on medicine compared to off medicine.  Today her physical and neurological examination was normal.  I would like to continue all her medication the way she is taking and have discussed the controlled substance policy, abuse potential, risk benefit and the precautions to be taken and depending on how she does I might make future recommendation when she comes back to see me in 4 months    I did review the medication list.    Due to technical limitations of voice recognition and human error, this note may not accurately reflect the care of the patient.    Review of Systems   Constitutional:  Negative for fatigue, fever and unexpected weight change.   HENT:  Negative for dental problem, ear pain, hearing loss, sinus pressure, tinnitus and trouble swallowing.    Eyes:  Negative for photophobia, pain and visual disturbance.   Respiratory:  Negative for cough, shortness of breath and wheezing.    Cardiovascular:  Negative for chest pain, palpitations and leg swelling.   Gastrointestinal:  Negative for abdominal pain, nausea and vomiting.   Genitourinary:  Negative for difficulty urinating, enuresis and frequency.   Musculoskeletal:  Negative for arthralgias, back pain, joint swelling, neck pain and neck stiffness.   Skin:  Negative for pallor and rash.   Allergic/Immunologic: Negative for food allergies.   Neurological:  Negative for dizziness, tremors, seizures, syncope, facial asymmetry, speech difficulty, weakness, light-headedness, numbness and headaches.   Hematological:  Negative for adenopathy. Does not bruise/bleed easily.   Psychiatric/Behavioral:  Positive for decreased concentration. Negative for  "agitation, behavioral problems, confusion, hallucinations and sleep disturbance. The patient is nervous/anxious. The patient is not hyperactive.         Patient Active Problem List   Diagnosis    ADHD (attention deficit hyperactivity disorder), combined type    De Quervain's tenosynovitis, right    Encephalopathy    Knee pain, bilateral    Right ankle sprain    First trimester bleeding ([facility]HS-HCC)    History of diet controlled gestational diabetes mellitus (GDM)    Kidney stone    Mononucleosis    STD (female)    ADHD (attention deficit hyperactivity disorder)     Past Medical History:   Diagnosis Date    Personal history of other diseases of the female genital tract     History of dysmenorrhea    Personal history of other mental and behavioral disorders     History of attention deficit disorder     History reviewed. No pertinent surgical history.    reports that she has never smoked. She has never used smokeless tobacco. She reports that she does not currently use alcohol. She reports that she does not use drugs.    /70 (BP Location: Left arm, Patient Position: Sitting, BP Cuff Size: Large adult)   Pulse 93   Ht 1.6 m (5' 3\")   Wt 57.2 kg (126 lb 3.2 oz)   BMI 22.36 kg/m²     OBJECTIVE  Physical Exam/Neurological Exam   Constitutional: General appearance: no acute distress   Auscultation of Heart: Regular rate and rhythm, no murmurs, normal S1 and S2.   Carotid Arteries: Intact without any bruits.   Neck is supple.   No lymph adenopathy.   Peripheral Vascular Exam: Pulses +2 and equal in all extremities. No swelling, varicosities, edema or tenderness to palpations.    Abdomen is soft, nondistended. No organomegaly.  Mental status: The patient was in no distress, alert, interactive and cooperative. Affect is appropriate.   Orientation: oriented to person, oriented to place and oriented to time.   Memory: recent memory intact and remote memory intact.   Attention: normal attention span and normal " concentrating ability.   Language: normal comprehension and no difficulty naming common objects.   Fund of knowledge: Patient displays adequate knowledge of current events, adequate fund of knowledge regarding past history and adequate fund of knowledge regarding vocabulary.   Eyes: The ophthalmoscopic examination was normal. The fundi are visualized with normal disc margins and without.  Cranial nerve II: Visual fields full to confrontation.   Cranial nerves III, IV, and VI: Pupils round, equally reactive to light; no ptosis. EOMs intact. No nystagmus.   Cranial Nerve V: Facial sensation intact bilaterally.   Cranial nerve VII: Normal and symmetric facial strength.   Cranial nerve VIII: Hearing is intact bilaterally to finger rub / whisper.   Cranial nerves IX and X: Palate elevates symmetrically.   Cranial nerve XI: Shoulder shrug and neck rotation strength are intact.   Cranial nerve XII: Tongue midline with normal strength.   Motor: Motor exam was normal. Muscle bulk was normal in both upper and lower extremities. Muscle tone was normal in both upper and lower extremities. Muscle strength was 5/5 throughout. no abnormal or adventitious movements were present.   Deep Tendon Reflexes: left biceps 2+ , right biceps 2+, left triceps 2+, right triceps 2+, left brachioradialis 2+, right brachioradialis 2+, left patella 2+, right patella 2+, left ankle jerk 2+, right ankle jerk 2+   Plantar Reflex: Toes downgoing to plantar stimulation on the left. Toes downgoing to plantar stimulation on the right.   Sensory Exam: Normal to light touch.   Coordination: There is no limb dystaxia and rapid alternating movements are intact.  Gait: Gait is normal without spasticity, ataxia or bradykinesia. Stance is stable with a negative Romberg.      ASSESSMENT/PLAN  Diagnoses and all orders for this visit:  Encephalopathy, unspecified type  ADHD (attention deficit hyperactivity disorder), combined type  -     Drug Screen, Urine With  Reflex to Confirmation        Ashu White MD  3/27/2025  12:08 PM

## 2025-03-29 LAB
AMPHET UR-MCNC: 1389 NG/ML
AMPHETAMINES UR QL: POSITIVE NG/ML
BARBITURATES UR QL: NEGATIVE NG/ML
BENZODIAZ UR QL: NEGATIVE NG/ML
BZE UR QL: NEGATIVE NG/ML
CREAT UR-MCNC: 15.2 MG/DL
DRUG SCREEN COMMENT UR-IMP: ABNORMAL
METHADONE UR QL: NEGATIVE NG/ML
METHAMPHET UR-MCNC: NEGATIVE NG/ML
OPIATES UR QL: NEGATIVE NG/ML
OXIDANTS UR QL: NEGATIVE MCG/ML
OXYCODONE UR QL: NEGATIVE NG/ML
PCP UR QL: NEGATIVE NG/ML
PH UR: 7.3 [PH] (ref 4.5–9)
QUEST NOTES AND COMMENTS: ABNORMAL
SP GR UR: 1
THC UR QL: NEGATIVE NG/ML

## 2025-04-11 ENCOUNTER — TELEPHONE (OUTPATIENT)
Dept: NEUROLOGY | Facility: CLINIC | Age: 35
End: 2025-04-11
Payer: COMMERCIAL

## 2025-04-11 DIAGNOSIS — F98.8 ADD (ATTENTION DEFICIT DISORDER) WITHOUT HYPERACTIVITY: ICD-10-CM

## 2025-04-11 RX ORDER — AMPHETAMINE 20 MG/1
1 TABLET, EXTENDED RELEASE ORAL DAILY
Qty: 30 TABLET | Refills: 0 | Status: SHIPPED | OUTPATIENT
Start: 2025-04-11 | End: 2025-05-11

## 2025-04-11 RX ORDER — AMPHETAMINE 20 MG/1
20 TABLET, EXTENDED RELEASE ORAL DAILY
Qty: 30 TABLET | Refills: 0 | Status: SHIPPED | OUTPATIENT
Start: 2025-06-10 | End: 2025-07-10

## 2025-04-11 RX ORDER — AMPHETAMINE 20 MG/1
20 TABLET, EXTENDED RELEASE ORAL DAILY
Qty: 30 TABLET | Refills: 0 | Status: SHIPPED | OUTPATIENT
Start: 2025-05-10

## 2025-04-11 NOTE — TELEPHONE ENCOUNTER
Dyanavel XR 20 mg tablet, IR - ER, biphasic 24hr   Was not sent after last visit on 3/27. Asking if the next 3 month scripts could be sent to the [facility].

## 2025-04-14 ENCOUNTER — TELEPHONE (OUTPATIENT)
Dept: NEUROLOGY | Facility: CLINIC | Age: 35
End: 2025-04-14
Payer: COMMERCIAL

## 2025-04-15 ENCOUNTER — TELEPHONE (OUTPATIENT)
Dept: NEUROLOGY | Facility: CLINIC | Age: 35
End: 2025-04-15
Payer: COMMERCIAL

## 2025-04-15 NOTE — TELEPHONE ENCOUNTER
PA approval faxed to [employer] at [phone].   Approved  Prior Authorization Portal   Prior authorization approved  Payer: [employer] Patient's Payer Case ID: [id number]    [phone]  Note from payer: Meets Pharmacy Policy Meets Pharmacy Policy  Approval Details    Authorized from February 14, 2025 to April 13, 2026

## 2025-07-07 ENCOUNTER — APPOINTMENT (OUTPATIENT)
Dept: NEUROLOGY | Facility: CLINIC | Age: 35
End: 2025-07-07
Payer: COMMERCIAL

## 2025-07-07 VITALS
DIASTOLIC BLOOD PRESSURE: 77 MMHG | BODY MASS INDEX: 23.21 KG/M2 | HEART RATE: 76 BPM | WEIGHT: 131 LBS | SYSTOLIC BLOOD PRESSURE: 126 MMHG | HEIGHT: 63 IN

## 2025-07-07 DIAGNOSIS — G93.40 ENCEPHALOPATHY, UNSPECIFIED TYPE: Primary | ICD-10-CM

## 2025-07-07 DIAGNOSIS — F98.8 ADD (ATTENTION DEFICIT DISORDER) WITHOUT HYPERACTIVITY: ICD-10-CM

## 2025-07-07 PROCEDURE — 1036F TOBACCO NON-USER: CPT | Performed by: PSYCHIATRY & NEUROLOGY

## 2025-07-07 PROCEDURE — 99214 OFFICE O/P EST MOD 30 MIN: CPT | Performed by: PSYCHIATRY & NEUROLOGY

## 2025-07-07 PROCEDURE — 3008F BODY MASS INDEX DOCD: CPT | Performed by: PSYCHIATRY & NEUROLOGY

## 2025-07-07 RX ORDER — AMPHETAMINE 20 MG/1
1 TABLET, EXTENDED RELEASE ORAL DAILY
Qty: 30 TABLET | Refills: 0 | Status: SHIPPED | OUTPATIENT
Start: 2025-09-05 | End: 2025-10-05

## 2025-07-07 RX ORDER — AMPHETAMINE 20 MG/1
20 TABLET, EXTENDED RELEASE ORAL DAILY
Qty: 30 TABLET | Refills: 0 | Status: SHIPPED | OUTPATIENT
Start: 2025-07-07 | End: 2025-08-06

## 2025-07-07 RX ORDER — AMPHETAMINE 20 MG/1
20 TABLET, EXTENDED RELEASE ORAL DAILY
Qty: 30 TABLET | Refills: 0 | Status: SHIPPED | OUTPATIENT
Start: 2025-08-06

## 2025-07-07 ASSESSMENT — ENCOUNTER SYMPTOMS
ARTHRALGIAS: 0
NAUSEA: 0
HYPERACTIVE: 0
NECK PAIN: 0
HEADACHES: 0
JOINT SWELLING: 0
CONFUSION: 0
SPEECH DIFFICULTY: 0
SINUS PRESSURE: 0
FEVER: 0
PHOTOPHOBIA: 0
TROUBLE SWALLOWING: 0
WEAKNESS: 0
LIGHT-HEADEDNESS: 0
FREQUENCY: 0
PALPITATIONS: 0
SEIZURES: 0
NECK STIFFNESS: 0
COUGH: 0
WHEEZING: 0
ADENOPATHY: 0
NUMBNESS: 0
VOMITING: 0
TREMORS: 0
HALLUCINATIONS: 0
AGITATION: 0
SLEEP DISTURBANCE: 0
BRUISES/BLEEDS EASILY: 0
DIZZINESS: 0
DECREASED CONCENTRATION: 1
SHORTNESS OF BREATH: 0
FATIGUE: 0
EYE PAIN: 0
BACK PAIN: 0
DIFFICULTY URINATING: 0
ABDOMINAL PAIN: 0
NERVOUS/ANXIOUS: 1
UNEXPECTED WEIGHT CHANGE: 0
FACIAL ASYMMETRY: 0

## 2025-07-07 ASSESSMENT — PATIENT HEALTH QUESTIONNAIRE - PHQ9
2. FEELING DOWN, DEPRESSED OR HOPELESS: NOT AT ALL
1. LITTLE INTEREST OR PLEASURE IN DOING THINGS: NOT AT ALL
SUM OF ALL RESPONSES TO PHQ9 QUESTIONS 1 & 2: 0

## 2025-07-07 NOTE — PROGRESS NOTES
Melina Hurst  35 y.o.       SUBJECTIVE  Melina is a 35-year-old young lady who was seen today for follow-up of her encephalopathy due to attention deficit disorder difficulty at work at home.  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  No side effects from the medication.  Today her physical and neurological examination is normal.  I would like to continue her Dyanavel the way she is taking and have discussed the controlled substance policy, abuse potential, risk benefit and the precautions to be taken and depending on how she does I might make future recommendation when she comes back to see me in 4 months.    I did review her medicine list.  Due to technical limitations of voice recognition and human error, this note may not accurately reflect the care of the patient.    Review of Systems   Constitutional:  Negative for fatigue, fever and unexpected weight change.   HENT:  Negative for dental problem, ear pain, hearing loss, sinus pressure, tinnitus and trouble swallowing.    Eyes:  Negative for photophobia, pain and visual disturbance.   Respiratory:  Negative for cough, shortness of breath and wheezing.    Cardiovascular:  Negative for chest pain, palpitations and leg swelling.   Gastrointestinal:  Negative for abdominal pain, nausea and vomiting.   Genitourinary:  Negative for difficulty urinating, enuresis and frequency.   Musculoskeletal:  Negative for arthralgias, back pain, joint swelling, neck pain and neck stiffness.   Skin:  Negative for pallor and rash.   Allergic/Immunologic: Negative for food allergies.   Neurological:  Negative for dizziness, tremors, seizures, syncope, facial asymmetry, speech difficulty, weakness, light-headedness, numbness and headaches.   Hematological:  Negative for adenopathy. Does not bruise/bleed easily.   Psychiatric/Behavioral:  Positive for decreased concentration. Negative for  "agitation, behavioral problems, confusion, hallucinations and sleep disturbance. The patient is nervous/anxious. The patient is not hyperactive.         Problem List[1]  Medical History[2]  Surgical History[3]    reports that she has never smoked. She has never been exposed to tobacco smoke. She has never used smokeless tobacco. She reports that she does not currently use alcohol. She reports that she does not currently use drugs.    /77 (BP Location: Right arm, Patient Position: Sitting, BP Cuff Size: Small adult)   Pulse 76   Ht 1.6 m (5' 3\")   Wt 59.4 kg (131 lb)   BMI 23.21 kg/m²     OBJECTIVE  Physical Exam/Neurological Exam   Constitutional: General appearance: no acute distress   Auscultation of Heart: Regular rate and rhythm, no murmurs, normal S1 and S2.   Carotid Arteries: Intact without any bruits.   Neck is supple.   No lymph adenopathy.   Peripheral Vascular Exam: Pulses +2 and equal in all extremities. No swelling, varicosities, edema or tenderness to palpations.    Abdomen is soft, nondistended. No organomegaly.  Mental status: The patient was in no distress, alert, interactive and cooperative. Affect is appropriate.   Orientation: oriented to person, oriented to place and oriented to time.   Memory: recent memory intact and remote memory intact.   Attention: normal attention span and normal concentrating ability.   Language: normal comprehension and no difficulty naming common objects.   Fund of knowledge: Patient displays adequate knowledge of current events, adequate fund of knowledge regarding past history and adequate fund of knowledge regarding vocabulary.   Eyes: The ophthalmoscopic examination was normal. The fundi are visualized with normal disc margins and without.  Cranial nerve II: Visual fields full to confrontation.   Cranial nerves III, IV, and VI: Pupils round, equally reactive to light; no ptosis. EOMs intact. No nystagmus.   Cranial Nerve V: Facial sensation intact " bilaterally.   Cranial nerve VII: Normal and symmetric facial strength.   Cranial nerve VIII: Hearing is intact bilaterally to finger rub / whisper.   Cranial nerves IX and X: Palate elevates symmetrically.   Cranial nerve XI: Shoulder shrug and neck rotation strength are intact.   Cranial nerve XII: Tongue midline with normal strength.   Motor: Motor exam was normal. Muscle bulk was normal in both upper and lower extremities. Muscle tone was normal in both upper and lower extremities. Muscle strength was 5/5 throughout. no abnormal or adventitious movements were present.   Deep Tendon Reflexes: left biceps 2+ , right biceps 2+, left triceps 2+, right triceps 2+, left brachioradialis 2+, right brachioradialis 2+, left patella 2+, right patella 2+, left ankle jerk 2+, right ankle jerk 2+   Plantar Reflex: Toes downgoing to plantar stimulation on the left. Toes downgoing to plantar stimulation on the right.   Sensory Exam: Normal to light touch.   Coordination: There is no limb dystaxia and rapid alternating movements are intact.  Gait: Gait is normal without spasticity, ataxia or bradykinesia. Stance is stable with a negative Romberg.      ASSESSMENT/PLAN  Diagnoses and all orders for this visit:  Encephalopathy, unspecified type  ADD (attention deficit disorder) without hyperactivity  -     Dyanavel XR 20 mg tablet, IR - ER, biphasic 24hr; Take 20 mg by mouth once daily.  -     Dyanavel XR 20 mg tablet, IR - ER, biphasic 24hr; Use 20 mg in the mouth or throat once daily. Do not fill before August 6, 2025.  -     Dyanavel XR 20 mg tablet, IR - ER, biphasic 24hr; Take 1 tablet by mouth once daily. Do not fill before September 5, 2025.        Ashu White MD  7/7/2025  10:57 AM       [1]   Patient Active Problem List  Diagnosis    ADHD (attention deficit hyperactivity disorder), combined type    De Quervain's tenosynovitis, right    Encephalopathy    Knee pain, bilateral    Right ankle sprain    First trimester  bleeding ([facility]-HCC)    History of diet controlled gestational diabetes mellitus (GDM)    Kidney stone    Mononucleosis    STD (female)    ADHD (attention deficit hyperactivity disorder)   [2]   Past Medical History:  Diagnosis Date    Personal history of other diseases of the female genital tract     History of dysmenorrhea    Personal history of other mental and behavioral disorders     History of attention deficit disorder   [3] History reviewed. No pertinent surgical history.

## 2025-10-27 ENCOUNTER — APPOINTMENT (OUTPATIENT)
Dept: NEUROLOGY | Facility: CLINIC | Age: 35
End: 2025-10-27
Payer: COMMERCIAL